# Patient Record
Sex: FEMALE | Race: BLACK OR AFRICAN AMERICAN | NOT HISPANIC OR LATINO | Employment: FULL TIME | URBAN - METROPOLITAN AREA
[De-identification: names, ages, dates, MRNs, and addresses within clinical notes are randomized per-mention and may not be internally consistent; named-entity substitution may affect disease eponyms.]

---

## 2024-01-27 ENCOUNTER — CLINICAL SUPPORT (OUTPATIENT)
Dept: EMERGENCY MEDICINE | Facility: HOSPITAL | Age: 44
End: 2024-01-27

## 2024-01-27 ENCOUNTER — APPOINTMENT (OUTPATIENT)
Dept: RADIOLOGY | Facility: HOSPITAL | Age: 44
End: 2024-01-27

## 2024-01-27 ENCOUNTER — HOSPITAL ENCOUNTER (EMERGENCY)
Facility: HOSPITAL | Age: 44
Discharge: HOME | End: 2024-01-27
Attending: EMERGENCY MEDICINE

## 2024-01-27 VITALS
WEIGHT: 160 LBS | RESPIRATION RATE: 16 BRPM | HEIGHT: 64 IN | OXYGEN SATURATION: 100 % | HEART RATE: 56 BPM | TEMPERATURE: 97.9 F | BODY MASS INDEX: 27.31 KG/M2 | DIASTOLIC BLOOD PRESSURE: 81 MMHG | SYSTOLIC BLOOD PRESSURE: 155 MMHG

## 2024-01-27 DIAGNOSIS — R10.84 GENERALIZED ABDOMINAL PAIN: Primary | ICD-10-CM

## 2024-01-27 LAB
ALBUMIN SERPL BCP-MCNC: 4.5 G/DL (ref 3.4–5)
ALP SERPL-CCNC: 68 U/L (ref 33–110)
ALT SERPL W P-5'-P-CCNC: 24 U/L (ref 7–45)
ANION GAP BLDV CALCULATED.4IONS-SCNC: 13 MMOL/L (ref 10–25)
ANION GAP SERPL CALC-SCNC: 16 MMOL/L (ref 10–20)
APPEARANCE UR: CLEAR
AST SERPL W P-5'-P-CCNC: 19 U/L (ref 9–39)
ATRIAL RATE: 51 BPM
BASE EXCESS BLDV CALC-SCNC: -0.4 MMOL/L (ref -2–3)
BASOPHILS # BLD AUTO: 0.04 X10*3/UL (ref 0–0.1)
BASOPHILS NFR BLD AUTO: 0.5 %
BILIRUB SERPL-MCNC: 0.7 MG/DL (ref 0–1.2)
BILIRUB UR STRIP.AUTO-MCNC: NEGATIVE MG/DL
BODY TEMPERATURE: 37 DEGREES CELSIUS
BUN SERPL-MCNC: 10 MG/DL (ref 6–23)
CA-I BLDV-SCNC: 1.18 MMOL/L (ref 1.1–1.33)
CALCIUM SERPL-MCNC: 9.7 MG/DL (ref 8.6–10.6)
CHLORIDE BLDV-SCNC: 104 MMOL/L (ref 98–107)
CHLORIDE SERPL-SCNC: 103 MMOL/L (ref 98–107)
CO2 SERPL-SCNC: 23 MMOL/L (ref 21–32)
COLOR UR: YELLOW
CREAT SERPL-MCNC: 0.73 MG/DL (ref 0.5–1.05)
EGFRCR SERPLBLD CKD-EPI 2021: >90 ML/MIN/1.73M*2
EOSINOPHIL # BLD AUTO: 0.08 X10*3/UL (ref 0–0.7)
EOSINOPHIL NFR BLD AUTO: 1 %
ERYTHROCYTE [DISTWIDTH] IN BLOOD BY AUTOMATED COUNT: 13.8 % (ref 11.5–14.5)
GLUCOSE BLDV-MCNC: 151 MG/DL (ref 74–99)
GLUCOSE SERPL-MCNC: 122 MG/DL (ref 74–99)
GLUCOSE UR STRIP.AUTO-MCNC: NEGATIVE MG/DL
HCO3 BLDV-SCNC: 24.1 MMOL/L (ref 22–26)
HCT VFR BLD AUTO: 33.6 % (ref 36–46)
HCT VFR BLD EST: 34 % (ref 36–46)
HGB BLD-MCNC: 11.6 G/DL (ref 12–16)
HGB BLDV-MCNC: 11.2 G/DL (ref 12–16)
HOLD SPECIMEN: NORMAL
HOLD SPECIMEN: NORMAL
IMM GRANULOCYTES # BLD AUTO: 0.01 X10*3/UL (ref 0–0.7)
IMM GRANULOCYTES NFR BLD AUTO: 0.1 % (ref 0–0.9)
KETONES UR STRIP.AUTO-MCNC: ABNORMAL MG/DL
LACTATE BLDV-SCNC: 1.5 MMOL/L (ref 0.4–2)
LEUKOCYTE ESTERASE UR QL STRIP.AUTO: NEGATIVE
LIPASE SERPL-CCNC: 16 U/L (ref 9–82)
LYMPHOCYTES # BLD AUTO: 3.21 X10*3/UL (ref 1.2–4.8)
LYMPHOCYTES NFR BLD AUTO: 39.5 %
MAGNESIUM SERPL-MCNC: 1.99 MG/DL (ref 1.6–2.4)
MCH RBC QN AUTO: 31.6 PG (ref 26–34)
MCHC RBC AUTO-ENTMCNC: 34.5 G/DL (ref 32–36)
MCV RBC AUTO: 92 FL (ref 80–100)
MONOCYTES # BLD AUTO: 0.55 X10*3/UL (ref 0.1–1)
MONOCYTES NFR BLD AUTO: 6.8 %
NEUTROPHILS # BLD AUTO: 4.24 X10*3/UL (ref 1.2–7.7)
NEUTROPHILS NFR BLD AUTO: 52.1 %
NITRITE UR QL STRIP.AUTO: NEGATIVE
NRBC BLD-RTO: 0 /100 WBCS (ref 0–0)
OXYHGB MFR BLDV: 51.9 % (ref 45–75)
P AXIS: 75 DEGREES
P OFFSET: 191 MS
P ONSET: 137 MS
PCO2 BLDV: 38 MM HG (ref 41–51)
PH BLDV: 7.41 PH (ref 7.33–7.43)
PH UR STRIP.AUTO: 7 [PH]
PHOSPHATE SERPL-MCNC: 1.9 MG/DL (ref 2.5–4.9)
PLATELET # BLD AUTO: 198 X10*3/UL (ref 150–450)
PO2 BLDV: 40 MM HG (ref 35–45)
POTASSIUM BLDV-SCNC: 3.6 MMOL/L (ref 3.5–5.3)
POTASSIUM SERPL-SCNC: 3.6 MMOL/L (ref 3.5–5.3)
PR INTERVAL: 158 MS
PREGNANCY TEST URINE, POC: NEGATIVE
PROT SERPL-MCNC: 7.5 G/DL (ref 6.4–8.2)
PROT UR STRIP.AUTO-MCNC: NEGATIVE MG/DL
Q ONSET: 216 MS
QRS COUNT: 9 BEATS
QRS DURATION: 102 MS
QT INTERVAL: 502 MS
QTC CALCULATION(BAZETT): 462 MS
QTC FREDERICIA: 476 MS
R AXIS: 79 DEGREES
RBC # BLD AUTO: 3.67 X10*6/UL (ref 4–5.2)
RBC # UR STRIP.AUTO: NEGATIVE /UL
SAO2 % BLDV: 53 % (ref 45–75)
SODIUM BLDV-SCNC: 137 MMOL/L (ref 136–145)
SODIUM SERPL-SCNC: 138 MMOL/L (ref 136–145)
SP GR UR STRIP.AUTO: 1.04
T AXIS: 77 DEGREES
T OFFSET: 467 MS
UROBILINOGEN UR STRIP.AUTO-MCNC: <2 MG/DL
VENTRICULAR RATE: 51 BPM
WBC # BLD AUTO: 8.1 X10*3/UL (ref 4.4–11.3)

## 2024-01-27 PROCEDURE — 83690 ASSAY OF LIPASE: CPT

## 2024-01-27 PROCEDURE — 99285 EMERGENCY DEPT VISIT HI MDM: CPT | Mod: 25

## 2024-01-27 PROCEDURE — 81003 URINALYSIS AUTO W/O SCOPE: CPT

## 2024-01-27 PROCEDURE — 81025 URINE PREGNANCY TEST: CPT

## 2024-01-27 PROCEDURE — 85025 COMPLETE CBC W/AUTO DIFF WBC: CPT

## 2024-01-27 PROCEDURE — 84132 ASSAY OF SERUM POTASSIUM: CPT

## 2024-01-27 PROCEDURE — 2500000001 HC RX 250 WO HCPCS SELF ADMINISTERED DRUGS (ALT 637 FOR MEDICARE OP)

## 2024-01-27 PROCEDURE — 96374 THER/PROPH/DIAG INJ IV PUSH: CPT | Mod: 59

## 2024-01-27 PROCEDURE — 36415 COLL VENOUS BLD VENIPUNCTURE: CPT

## 2024-01-27 PROCEDURE — 74177 CT ABD & PELVIS W/CONTRAST: CPT

## 2024-01-27 PROCEDURE — 83735 ASSAY OF MAGNESIUM: CPT

## 2024-01-27 PROCEDURE — 84100 ASSAY OF PHOSPHORUS: CPT

## 2024-01-27 PROCEDURE — 2550000001 HC RX 255 CONTRASTS: Performed by: EMERGENCY MEDICINE

## 2024-01-27 PROCEDURE — 93005 ELECTROCARDIOGRAM TRACING: CPT

## 2024-01-27 PROCEDURE — 2500000005 HC RX 250 GENERAL PHARMACY W/O HCPCS

## 2024-01-27 PROCEDURE — 74177 CT ABD & PELVIS W/CONTRAST: CPT | Mod: FOREIGN READ | Performed by: RADIOLOGY

## 2024-01-27 PROCEDURE — 96375 TX/PRO/DX INJ NEW DRUG ADDON: CPT

## 2024-01-27 PROCEDURE — 2500000004 HC RX 250 GENERAL PHARMACY W/ HCPCS (ALT 636 FOR OP/ED)

## 2024-01-27 PROCEDURE — 96361 HYDRATE IV INFUSION ADD-ON: CPT

## 2024-01-27 PROCEDURE — 99284 EMERGENCY DEPT VISIT MOD MDM: CPT | Performed by: EMERGENCY MEDICINE

## 2024-01-27 RX ORDER — METOCLOPRAMIDE HYDROCHLORIDE 5 MG/ML
10 INJECTION INTRAMUSCULAR; INTRAVENOUS ONCE
Status: COMPLETED | OUTPATIENT
Start: 2024-01-27 | End: 2024-01-27

## 2024-01-27 RX ORDER — DICYCLOMINE HYDROCHLORIDE 20 MG/1
20 TABLET ORAL 2 TIMES DAILY
Qty: 20 TABLET | Refills: 0 | Status: SHIPPED | OUTPATIENT
Start: 2024-01-27 | End: 2024-02-06

## 2024-01-27 RX ORDER — ONDANSETRON 4 MG/1
4 TABLET, ORALLY DISINTEGRATING ORAL ONCE
Status: COMPLETED | OUTPATIENT
Start: 2024-01-27 | End: 2024-01-27

## 2024-01-27 RX ORDER — ONDANSETRON 4 MG/1
4 TABLET, FILM COATED ORAL EVERY 6 HOURS
Qty: 12 TABLET | Refills: 0 | Status: SHIPPED | OUTPATIENT
Start: 2024-01-27 | End: 2024-01-30

## 2024-01-27 RX ORDER — MORPHINE SULFATE 4 MG/ML
4 INJECTION INTRAVENOUS ONCE
Status: COMPLETED | OUTPATIENT
Start: 2024-01-27 | End: 2024-01-27

## 2024-01-27 RX ORDER — ONDANSETRON 4 MG/1
TABLET, ORALLY DISINTEGRATING ORAL
Status: COMPLETED
Start: 2024-01-27 | End: 2024-01-27

## 2024-01-27 RX ORDER — OXYCODONE AND ACETAMINOPHEN 5; 325 MG/1; MG/1
1 TABLET ORAL ONCE
Status: COMPLETED | OUTPATIENT
Start: 2024-01-27 | End: 2024-01-27

## 2024-01-27 RX ORDER — HYDROMORPHONE HYDROCHLORIDE 1 MG/ML
0.5 INJECTION, SOLUTION INTRAMUSCULAR; INTRAVENOUS; SUBCUTANEOUS ONCE
Status: COMPLETED | OUTPATIENT
Start: 2024-01-27 | End: 2024-01-27

## 2024-01-27 RX ORDER — POLYETHYLENE GLYCOL 3350 17 G/17G
17 POWDER, FOR SOLUTION ORAL DAILY
Qty: 3 PACKET | Refills: 0 | Status: SHIPPED | OUTPATIENT
Start: 2024-01-27 | End: 2024-01-30

## 2024-01-27 RX ADMIN — SODIUM CHLORIDE, POTASSIUM CHLORIDE, SODIUM LACTATE AND CALCIUM CHLORIDE 1000 ML: 600; 310; 30; 20 INJECTION, SOLUTION INTRAVENOUS at 02:11

## 2024-01-27 RX ADMIN — MORPHINE SULFATE 4 MG: 4 INJECTION INTRAVENOUS at 02:11

## 2024-01-27 RX ADMIN — OXYCODONE HYDROCHLORIDE AND ACETAMINOPHEN 1 TABLET: 5; 325 TABLET ORAL at 05:47

## 2024-01-27 RX ADMIN — HYDROMORPHONE HYDROCHLORIDE 0.5 MG: 1 INJECTION, SOLUTION INTRAMUSCULAR; INTRAVENOUS; SUBCUTANEOUS at 04:24

## 2024-01-27 RX ADMIN — ONDANSETRON 4 MG: 4 TABLET, ORALLY DISINTEGRATING ORAL at 00:49

## 2024-01-27 RX ADMIN — IOHEXOL 75 ML: 350 INJECTION, SOLUTION INTRAVENOUS at 03:35

## 2024-01-27 RX ADMIN — METOCLOPRAMIDE 10 MG: 5 INJECTION, SOLUTION INTRAMUSCULAR; INTRAVENOUS at 02:11

## 2024-01-27 ASSESSMENT — PAIN SCALES - GENERAL
PAINLEVEL_OUTOF10: 9
PAINLEVEL_OUTOF10: 8
PAINLEVEL_OUTOF10: 9

## 2024-01-27 ASSESSMENT — PAIN - FUNCTIONAL ASSESSMENT
PAIN_FUNCTIONAL_ASSESSMENT: 0-10

## 2024-01-27 ASSESSMENT — COLUMBIA-SUICIDE SEVERITY RATING SCALE - C-SSRS
2. HAVE YOU ACTUALLY HAD ANY THOUGHTS OF KILLING YOURSELF?: NO
1. IN THE PAST MONTH, HAVE YOU WISHED YOU WERE DEAD OR WISHED YOU COULD GO TO SLEEP AND NOT WAKE UP?: NO
6. HAVE YOU EVER DONE ANYTHING, STARTED TO DO ANYTHING, OR PREPARED TO DO ANYTHING TO END YOUR LIFE?: NO

## 2024-01-27 NOTE — ED TRIAGE NOTES
Patient presents to the ED for abdominal pain x3 days. States the pain wraps around to her back. Also endorsing swelling in BL legs. PMHx: none

## 2024-01-27 NOTE — ED PROVIDER NOTES
HPI:      Limitations to History: None  Additional History Obtained from: Family  External records reviewed: N/a    Chief Complaint   Patient presents with    Abdominal Pain          Jai Lombardi is a 43 y.o. female with no past medical history presenting to the ED with abdominal pain that has gotten progressively worse.  Patient states that about 3 months ago she was noted to have left-sided flank and abdominal pain which was intermittent but constant.  At some point before today's visit, did note to have some dysuria, took over-the-counter medications, and noted improvement.  3 days ago noted significant increase in her pain in addition to chills, now located primarily over her central abdomen, described as sharp, stabbing and twisting.  Family in room notes that before today she had not had a bowel movement in 3 to 4 days.  Denies any melena, or blood with bowel movements.  She has been tolerating p.o. intake until she began vomiting on arrival to the emergency department.  She denies any fevers, chest pain, shortness of breath, hematemesis, dysuria, hematuria, vaginal discharge, or any diarrhea.  She does note that she recently started her menstrual cycle a few days ago, notes that it has been normal compared to prior.    Past Medical History:   Diagnosis Date    Other specified health status     No known health problems     Past Surgical History:   Procedure Laterality Date    OTHER SURGICAL HISTORY  07/14/2016    Prior Surgical Procedure Not Done        Allergies   Allergen Reactions    Ibuprofen Swelling       --------------------------------------------------------------------------------------------------------------------------------------    VS: As documented in the triage note and EMR flowsheet from this visit were reviewed.  Temp 36.6 °C (97.9 °F) HR 56 /81 RR 16 Sat 100 % on      Physical Exam:  GEN: Actively vomiting on examination, appears uncomfortable and ill appearing. Conversational  and appropriate.    HEENT: Normocephalic, atraumatic. Conjunctiva pink with no redness or exudates. Hearing grossly intact. Moist mucous membranes.  CARDIO: Normal rate and regular rhythm. Normal S1, S2  without murmurs, rubs, or gallops.   PULM: Clear to auscultation bilaterally. No rales, rhonchi, or wheezes. No accessory muscle use or stridor. Speaking in full sentences.  GI: Soft, diffusely tender abdomen and bilateral CVA tenderness, non-distended.  Mild guarding, no rebound tenderness.  SKIN: Warm and dry, no rashes, lesions, petechiae, or purpura.  MSK: ROM intact in all 4 extremities without contractures or pain. No peripheral edema, contusions, or wounds.    NEURO: A&Ox3, No focal findings identified. No confusion or gross mental status changes.  PSYCH: Appropriate mood and behavior, converses and responds appropriately during exam.        ---------------------------------------------------------------------------------------------------------------------------------------    Given in the ED:   Medications   lactated Ringer's bolus 1,000 mL (1,000 mL intravenous New Bag 1/27/24 0211)   ondansetron ODT (Zofran-ODT) disintegrating tablet 4 mg (4 mg oral Given 1/27/24 0049)   morphine injection 4 mg (4 mg intravenous Given 1/27/24 0211)   metoclopramide (Reglan) injection 10 mg (10 mg intravenous Given 1/27/24 0211)      Work up: All labs and imaging were independently reviewed by me.    Labs Reviewed   BLOOD GAS VENOUS FULL PANEL UNSOLICITED - Abnormal       Result Value    POCT pH, Venous 7.41      POCT pCO2, Venous 38 (*)     POCT pO2, Venous 40      POCT SO2, Venous 53      POCT Oxy Hemoglobin, Venous 51.9      POCT Hematocrit Calculated, Venous 34.0 (*)     POCT Sodium, Venous 137      POCT Potassium, Venous 3.6      POCT Chloride, Venous 104      POCT Ionized Calicum, Venous 1.18      POCT Glucose, Venous 151 (*)     POCT Lactate, Venous 1.5      POCT Base Excess, Venous -0.4      POCT HCO3 Calculated,  Venous 24.1      POCT Hemoglobin, Venous 11.2 (*)     POCT Anion Gap, Venous 13.0      Patient Temperature 37.0     CBC WITH AUTO DIFFERENTIAL   LIPASE   COMPREHENSIVE METABOLIC PANEL   PHOSPHORUS   MAGNESIUM   LACTATE   URINALYSIS WITH REFLEX CULTURE AND MICROSCOPIC    Narrative:     The following orders were created for panel order Urinalysis with Reflex Culture and Microscopic.  Procedure                               Abnormality         Status                     ---------                               -----------         ------                     Urinalysis with Reflex C...[[id number]]                                                 Extra Urine Gray Tube[[id number]]                                                         Please view results for these tests on the individual orders.   URINALYSIS WITH REFLEX CULTURE AND MICROSCOPIC   EXTRA URINE GRAY TUBE   POCT PREGNANCY, URINE   POCT GLUCOSE METER       CT abdomen pelvis w IV contrast    (Results Pending)       Medications   lactated Ringer's bolus 1,000 mL (1,000 mL intravenous New Bag 1/27/24 0211)   ondansetron ODT (Zofran-ODT) disintegrating tablet 4 mg (4 mg oral Given 1/27/24 0049)   morphine injection 4 mg (4 mg intravenous Given 1/27/24 0211)   metoclopramide (Reglan) injection 10 mg (10 mg intravenous Given 1/27/24 0211)         MDM:  Briefly, this is a [age]-year-old female with no prior medical history presents the ED with abdominal pain, and is overall ill-appearing, actively vomiting on examination with diffuse tenderness of the abdomen and flank.  Differential diagnoses include pyelonephritis, UTI, appendicitis, peritonitis, pancreatitis, DKA, among other intra-abdominal pathologies.  Low suspicion for acute pregnancy/ruptured ectopic due to current regular menstrual cycles.  Low suspicion for tubo-ovarian abscess, or STDs due to lack of vaginal discharge or high risk sexual behavior.  Patient will be signed out to oncoming provider, hemodynamically  stable, pending lab work, imaging, and reevaluation.    Impression:   1. Generalized abdominal pain        Plan and Disposition: Signout to oncoming provider, pending lab work and imaging.    Sam Delgadillo DO  Emergency Medicine, PGY-2      Patient was seen and evaluated by the attending physician. The attending ED physician agrees with the plan. Patient and/or patient´s representative was counseled regarding labs, imaging, likely diagnosis, and plan. All questions were answered.    Disclaimer: This note was dictated by speech recognition.  Attempt at proofreading was made to minimize errors.  Errors in transcription may be present.  Please call if questions.     Sam Delgadillo DO  Resident  01/27/24 5086

## 2024-01-27 NOTE — DISCHARGE INSTRUCTIONS
Please take your medications as prescribed.  Please return to the emergency department if you develop worsening symptoms.

## 2024-01-27 NOTE — PROGRESS NOTES
I received this patient during signout.  Please see previous provider's note for detailed H&P, labs and imaging.    Briefly, this is a 43-year-old female with no pertinent medical history presents to the ED with abdominal pain.  She also endorsed left-sided flank pain and dysuria.  Patient had labs ordered and CT scan ordered prior to my shift and given fluids, Zofran, Reglan and pain control.    Plan at the time of signout was await labs and imaging and disposition patient.    Under my care, patient reassessed and remains clinically stable.  CT and labs as stated below.  I discussed overall unremarkable results with patient.  On my reevaluation, she does endorse abdominal distention and constipation.  Patient to be discharged with antiemetics, Bentyl and MiraLAX.  I discussed strict return precautions with patient.  She verbalized understanding    @  ED Course as of 01/27/24 0506   Sat Jan 27, 2024   0448 CT reviewed:  IMPRESSION:  Focal areas of diminished cortical enhancement in both kidneys which  are nonspecific but may indicate pyelonephritis in the appropriate  clinical setting.  Correlate with urinalysis and clinical  presentation.  Heterogeneous appearance of the liver with portal hypertension and  pericholecystic fluid.  Findings may indicate hepatic congestion or  acute hepatitis, correlate with hepatic function tests.   [SA]   0449 Labs reviewed venous full panel overall within normal limits, normal lactate.  Metabolic panel within normal limits.  CBC stable.  Phosphorus repleted. [SA]   0500 UA negative for infection [SA]      ED Course User Index  [SA] Marine Abdul DO         Diagnoses as of 01/27/24 0506   Generalized abdominal pain   @    Disposition:     Discharged in stable condition. Patient will follow-up with the primary physician in the next 2-3 days. Return if worse. They understand return precautions and discharge instructions. Patient and family/friend/caregiver are in agreement with  this plan.     Patient seen and staffed with attending physician.     Marine Abudl DO   EM PGY2